# Patient Record
Sex: MALE | Race: WHITE | Employment: STUDENT | URBAN - METROPOLITAN AREA
[De-identification: names, ages, dates, MRNs, and addresses within clinical notes are randomized per-mention and may not be internally consistent; named-entity substitution may affect disease eponyms.]

---

## 2017-06-13 ENCOUNTER — HOSPITAL ENCOUNTER (OUTPATIENT)
Dept: PHYSICAL THERAPY | Age: 22
Discharge: HOME | End: 2017-06-30

## 2017-06-20 ENCOUNTER — HOSPITAL ENCOUNTER (OUTPATIENT)
Dept: PHYSICAL THERAPY | Age: 22
Discharge: HOME | End: 2017-06-20

## 2017-06-22 ENCOUNTER — HOSPITAL ENCOUNTER (OUTPATIENT)
Dept: PHYSICAL THERAPY | Age: 22
Discharge: HOME | End: 2017-06-22

## 2017-07-06 ENCOUNTER — HOSPITAL ENCOUNTER (OUTPATIENT)
Dept: PHYSICAL THERAPY | Age: 22
Discharge: HOME | End: 2017-07-06

## 2017-07-10 ENCOUNTER — HOSPITAL ENCOUNTER (OUTPATIENT)
Dept: PHYSICAL THERAPY | Age: 22
Discharge: HOME | End: 2017-07-10

## 2017-07-13 ENCOUNTER — HOSPITAL ENCOUNTER (OUTPATIENT)
Dept: PHYSICAL THERAPY | Age: 22
Discharge: HOME | End: 2017-07-13

## 2017-07-17 ENCOUNTER — HOSPITAL ENCOUNTER (OUTPATIENT)
Dept: PHYSICAL THERAPY | Age: 22
Discharge: HOME | End: 2017-07-17

## 2017-07-20 ENCOUNTER — HOSPITAL ENCOUNTER (OUTPATIENT)
Dept: PHYSICAL THERAPY | Age: 22
Discharge: HOME | End: 2017-07-20

## 2017-08-04 ENCOUNTER — HOSPITAL ENCOUNTER (OUTPATIENT)
Dept: PHYSICAL THERAPY | Age: 22
Discharge: HOME | End: 2017-08-04

## 2017-08-09 ENCOUNTER — HOSPITAL ENCOUNTER (OUTPATIENT)
Dept: PHYSICAL THERAPY | Age: 22
Discharge: HOME | End: 2017-08-09

## 2017-08-11 ENCOUNTER — HOSPITAL ENCOUNTER (OUTPATIENT)
Dept: PHYSICAL THERAPY | Age: 22
Discharge: HOME | End: 2017-08-11

## 2017-08-15 ENCOUNTER — HOSPITAL ENCOUNTER (OUTPATIENT)
Dept: PHYSICAL THERAPY | Age: 22
Discharge: HOME | End: 2017-08-15

## 2017-08-17 ENCOUNTER — HOSPITAL ENCOUNTER (OUTPATIENT)
Dept: PHYSICAL THERAPY | Age: 22
Discharge: HOME | End: 2017-08-17

## 2017-08-17 DIAGNOSIS — M25.521 RIGHT ELBOW PAIN: Primary | ICD-10-CM

## 2017-12-27 ENCOUNTER — HOSPITAL ENCOUNTER (OUTPATIENT)
Dept: PHYSICAL THERAPY | Age: 22
Discharge: HOME | End: 2017-12-31

## 2017-12-27 NOTE — FLOWSHEET NOTE
[place] , [facility]    Physical Therapy Daily Treatment Note  Date:  2017    Patient Name:  Basilia Jackson    :  1995  MRN: [id number]  Restrictions/Precautions:    Medical/Treatment Diagnosis Information:  · Diagnosis: R shoulder impingement, 1st Rib resection DOS 17  · Treatment Diagnosis: R shoulder pain V25.358  Insurance/Certification information:  PT Insurance Information: [place] - $1000 deductbile, $0 copay, 80/20% coinsurance, 18 PT visits left  Physician Information:  Referring Practitioner: Dr. Bernadine Pérez of care signed (Y/N): N    Date of Patient follow up with Physician:      G-Code (if applicable):      Date G-Code Applied:         Progress Note: [x]  Yes  []  No  Next due by: Visit #10      Latex Allergy:  [x]NO      []YES  Preferred Language for Healthcare:   [x]English       []other:    Visit # Insurance Allowable   12   17 18     Pain level:  0/10     SUBJECTIVE:   Returns today after being away at school. Says that he still is having some pain in the shoulder when he throws. Says that he does not have the sensation he was having prior to surgery, but feels a pinching feeling in the back of the shoulder blade and pain with throwing. Says that he needs to continue with his PT while he is away from school and that is why he returns today. .  OBJECTIVE:   Observation:   Test measurements:    7/10/17 Posterior capsule/IR stretch emphasis with PROM. Has medial elbow pain with arm extended  17 Pt has medial elbow pain at this date. Stabilization of scapula during ABD PROM. 17 Pt is TTP along lateral border of scapula at this date. 17 Pt TTP along lateral and medial border of scapula at this date. 17  TTP over distal tricep and along lateral border of scapula- teres .  Negative adson TOS test after 1 minute  RESTRICTIONS/PRECAUTIONS:     Exercises/Interventions: Held therapeutic ex today to focus on tightness around scapula and lats  Therapeutic Ex  Wt / Resistance Sets/sec Reps Notes   90/90 on bosu  Yellow BB  15\" 5x Pitching static  stance    Cane AAROM flex/Bench press  1 30 10#         2#   30\" 5x    3'  10x  Bent over Lower Trap Raise  6# 2 15 Wall Servando Slide 90/90  2 15 Prone ER/ IR 90/90 2# 3 10 Supine ABC 9# 1 2 Supine SA punch 10# 1 30  Standing abduction 2# 1 30 SL punches 4# 1 30 SL ER 6# 2 30 Prone Rows/HAB/ext 4# / 2# / 4# 1 30     gym for ROWs/ LPD     HEP   MH ABD/Hip EXT  B            Standing flex/scap    Rhomboid/Medial Border Stretch     No money wine 1 30    IR sleeper/ w/ towel roll under shoulder  30\" 3 Thumb down/ on ex foam   CBA / rhombiod stretch   15\" 3    Upper trap/ cervical stretching   Cerv isometrics/ cerv extension    HEP      LPD hands  Decreased ROM    Manual Intervention 25'       Shld /GH Mobs / PROM  20 min  graston to posterior shoulder complex/ triceps   Post Cap mobs       Thoracic/Rib manipualtion       CT MT/Mobs       T-spine Manip         Prone LLLD IR with OP and STM  NMR re-education 15'       SB waklkouts         SB I's Y's T's  TBar Wrist Flexion     LA BOSU Planks   [address] [place] [address] Flexion  Squeeze         Water stick Body blade    Wall ball roll/ CW/CCW 4# 1 20     rhythmic stabs ECC ball toss   Towel throwing mechanics  Prone ER/IR 90/90     2#  10 3    FLEX/  Green  2 15 Row and rotate CC OH/ wood choppers ball toss D2 flex pattern w/ BB  yellow 2 10               Therapeutic Exercise and NMR EXR  [x] (00663) Provided verbal/tactile cueing for activities related to strengthening, flexibility, endurance, ROM  for improvements in scapular, scapulothoracic and UE control with self care, reaching, carrying, lifting, house/yardwork, driving/computer work.    [] (80849) Provided verbal/tactile cueing for activities related to improving balance, coordination, kinesthetic sense, posture, motor skill, proprioception  to assist with  scapular, applying Dry needling treatment. The treatment sites where cleaned with 70% solution of  isopropyl alcohol . The PT washed their hands and utilized treatment gloves along with hand  prior to inserting the needles. All needles where removed and discarded in the appropriate sharps container. Modalities: MH to R shoulder and latissumus x15'    Charges:  Timed Code Treatment Minutes: 35   Total Treatment Minutes: 50     [] EVAL  [] AS(47651) x      [] IONTO  [] NMR (22006) x      [] VASO  [x] Manual (70643) x  2    [] Other:  [] TA x       [] Mech Traction (57068)  [] ES(attended) (56667)      [] ES (un) (24276):     GOALS:  Patient stated goal: Wants to get back to pitching without pain     Therapist goals for Patient:   Short Term Goals: To be achieved in: 2 weeks  1. Independent in HEP and progression per patient tolerance, in order to prevent re-injury. 2. Patient will have a decrease in pain to facilitate improvement in movement, function, and ADLs as indicated by Functional Deficits.     Long Term Goals: To be achieved in: 12 weeks  1. Disability index score of 10% or less for the DASH to assist with reaching prior level of function. 2. Patient will demonstrate increased AROM to have R shoulder equal to L in all planes to allow for proper joint functioning as indicated by patients Functional Deficits. 3. Patient will demonstrate an increase in Strength to 5/5 to allow for proper functional mobility as indicated by patients Functional Deficits. 4. Patient will return to overhead functional activities without increased symptoms or restriction. 5. Patient will display sufficient ROM and strength to begin throwing program      Progression Towards Functional goals:  [x] Patient is progressing as expected towards functional goals listed. [] Progression is slowed due to complexities listed. [] Progression has been slowed due to co-morbidities.   [] Plan just implemented, too soon to assess goals progression  [] Other:     ASSESSMENT:  Tolerated dry needling well  Some relief from tightness in the latissimus after needling. Treatment/Activity Tolerance:  [x] Patient tolerated treatment well [] Patient limited by fatique  [] Patient limited by pain  [] Patient limited by other medical complications  [] Other:     Prognosis: [x] Good [] Fair  [] Poor    Patient Requires Follow-up: [x] Yes  [] No    PLAN:  Cont with 1 more session when patient returns from [place] and then transfer back down to [facility].      [] Continue per plan of care [] Alter current plan (see comments)  [] Plan of care initiated [] Hold pending MD visit [x] Discharge from PT at this facility    Electronically signed by: Radha Vera PT

## 2018-01-01 ENCOUNTER — HOSPITAL ENCOUNTER (OUTPATIENT)
Dept: PHYSICAL THERAPY | Age: 23
Discharge: HOME | End: 2018-01-31

## 2018-06-25 ENCOUNTER — OFFICE VISIT (OUTPATIENT)
Dept: ORTHOPEDIC SURGERY | Age: 23
End: 2018-06-25

## 2018-06-25 VITALS — HEIGHT: 75 IN | BODY MASS INDEX: 23 KG/M2 | WEIGHT: 185 LBS

## 2018-06-25 DIAGNOSIS — S76.112A QUADRICEPS STRAIN, LEFT, INITIAL ENCOUNTER: Primary | ICD-10-CM

## 2018-06-25 PROCEDURE — 99202 OFFICE O/P NEW SF 15 MIN: CPT | Performed by: INTERNAL MEDICINE

## 2018-06-25 RX ORDER — LEVOTHYROXINE SODIUM 50 MCG
TABLET ORAL
Refills: 0 | COMMUNITY
Start: 2018-06-17

## 2018-07-01 ENCOUNTER — HOSPITAL ENCOUNTER (OUTPATIENT)
Dept: PHYSICAL THERAPY | Age: 23
Discharge: HOME | End: 2018-07-31
Attending: INTERNAL MEDICINE | Admitting: INTERNAL MEDICINE